# Patient Record
Sex: FEMALE | Race: WHITE | NOT HISPANIC OR LATINO
[De-identification: names, ages, dates, MRNs, and addresses within clinical notes are randomized per-mention and may not be internally consistent; named-entity substitution may affect disease eponyms.]

---
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
## 2024-03-15 ENCOUNTER — APPOINTMENT (OUTPATIENT)
Dept: ORTHOPEDIC SURGERY | Facility: CLINIC | Age: 19
End: 2024-03-15
Payer: COMMERCIAL

## 2024-03-15 VITALS — HEIGHT: 66 IN | WEIGHT: 180 LBS | BODY MASS INDEX: 28.93 KG/M2

## 2024-03-15 PROCEDURE — 99213 OFFICE O/P EST LOW 20 MIN: CPT

## 2024-03-15 PROCEDURE — 73562 X-RAY EXAM OF KNEE 3: CPT | Mod: LT

## 2024-03-29 ENCOUNTER — APPOINTMENT (OUTPATIENT)
Dept: MRI IMAGING | Facility: CLINIC | Age: 19
End: 2024-03-29
Payer: COMMERCIAL

## 2024-03-29 ENCOUNTER — OUTPATIENT (OUTPATIENT)
Dept: OUTPATIENT SERVICES | Facility: HOSPITAL | Age: 19
LOS: 1 days | End: 2024-03-29
Payer: COMMERCIAL

## 2024-03-29 DIAGNOSIS — M25.362 OTHER INSTABILITY, LEFT KNEE: ICD-10-CM

## 2024-03-29 PROCEDURE — 73721 MRI JNT OF LWR EXTRE W/O DYE: CPT | Mod: 26,LT

## 2024-03-29 PROCEDURE — 73721 MRI JNT OF LWR EXTRE W/O DYE: CPT

## 2024-03-29 NOTE — DISCUSSION/SUMMARY
[de-identified] : 18-year-old female with recurrent left knee patellar instability.  We discussed treatment options at length.  We discussed nonoperative care with activity modification physical therapy with risk of further instability episodes.  We discussed surgical treatment.  This would likely be a left knee medial patellofemoral ligament reconstruction with hamstring allograft.  However we will obtain an MRI to further assess and discuss any potential additional procedures including patellar realignment based off the MRI findings.  Risks benefits alternatives were discussed at length including but not limited to risks such as bleeding infection nerve or vessel injury continued pain stiffness redislocation need for future surgery medical complications such as DVT or PE and anesthesia complications.  All questions were answered she will schedule her convenience we will obtain an MRI preoperatively

## 2024-03-29 NOTE — HISTORY OF PRESENT ILLNESS
[de-identified] : 18-year-old female presents for follow-up for her left knee.  Last seen several years ago for her left knee.  She states 4 weeks ago she laterally dislocated her kneecap, this was self reduced several moments later.  She placed her self into a knee immobilizer for a couple of days, then to a hinged knee brace for a couple more days.  She does feel significant improvement since then.  Initially her knee was swollen she developed bruising.  She has mild pain with kneeling.  Denies numbness tingling

## 2024-03-29 NOTE — PHYSICAL EXAM
[de-identified] : left knee exam  Skin: Clean, dry, intact Inspection: No obvious malalignment, no masses, no swelling, no effusion. Tenderness: no MJLT. No LJLT. + tenderness over the medial and lateral patella facets. No ttp medial/lateral epicondyle, patella tendon, tibial tubercle, pes anserinus, or proximal fibula. ROM: 0 to 140 no pain with deep flexion Patella: 3 quadrants lateral translation decreased rebound.  Positive J sign. Stability: Stable to varus, valgus, lachman testing. Negative anterior/posterior drawer. Additional tests: Negative McMurrays test, Negative patellar grind test.  Strength: 5/5 Q/H/TA/GS/EHL, no atrophy Neuro: In tact to light touch throughout in dp/sp/tib/aime/saph nerve districutions, DTR's normal Pulses: 2+ DP/PT pulses. [de-identified] : MRI right knee reviewed 2022.  Findings consistent with patellar instability.  There is edema at the lateral condyle and medial patellar facet   The following radiographs were ordered and read by me during this patients visit. I reviewed each radiograph in detail with the patient and discussed the findings as highlighted below.  3 views left knee were obtained today joint spaces are well-maintained there is dysplastic trochlea there is a patella michelle.

## 2024-03-29 NOTE — ADDENDUM
[FreeTextEntry1] : post op knee brace is essential following surgery on 5/20/24 for post op recovery and physical therapy.

## 2024-05-04 ENCOUNTER — NON-APPOINTMENT (OUTPATIENT)
Age: 19
End: 2024-05-04

## 2024-05-06 ENCOUNTER — OUTPATIENT (OUTPATIENT)
Dept: OUTPATIENT SERVICES | Facility: HOSPITAL | Age: 19
LOS: 1 days | Discharge: HOME | End: 2024-05-06

## 2024-05-06 VITALS
WEIGHT: 192.24 LBS | RESPIRATION RATE: 17 BRPM | SYSTOLIC BLOOD PRESSURE: 120 MMHG | TEMPERATURE: 98 F | DIASTOLIC BLOOD PRESSURE: 84 MMHG | OXYGEN SATURATION: 100 % | HEART RATE: 85 BPM | HEIGHT: 66 IN

## 2024-05-06 DIAGNOSIS — M25.362 OTHER INSTABILITY, LEFT KNEE: ICD-10-CM

## 2024-05-06 DIAGNOSIS — Z01.818 ENCOUNTER FOR OTHER PREPROCEDURAL EXAMINATION: ICD-10-CM

## 2024-05-06 LAB
ANION GAP SERPL CALC-SCNC: 10 MMOL/L — SIGNIFICANT CHANGE UP (ref 5–17)
BUN SERPL-MCNC: 14 MG/DL — SIGNIFICANT CHANGE UP (ref 7–23)
CALCIUM SERPL-MCNC: 9.3 MG/DL — SIGNIFICANT CHANGE UP (ref 8.5–10.1)
CHLORIDE SERPL-SCNC: 109 MMOL/L — HIGH (ref 96–108)
CO2 SERPL-SCNC: 21 MMOL/L — LOW (ref 22–31)
CREAT SERPL-MCNC: 0.72 MG/DL — SIGNIFICANT CHANGE UP (ref 0.5–1.3)
EGFR: 123 ML/MIN/1.73M2 — SIGNIFICANT CHANGE UP
GLUCOSE SERPL-MCNC: 82 MG/DL — SIGNIFICANT CHANGE UP (ref 70–99)
HCG SERPL-ACNC: <1 MIU/ML — SIGNIFICANT CHANGE UP
HCT VFR BLD CALC: 43.8 % — SIGNIFICANT CHANGE UP (ref 34.5–45)
HGB BLD-MCNC: 14.2 G/DL — SIGNIFICANT CHANGE UP (ref 11.5–15.5)
MCHC RBC-ENTMCNC: 28.5 PG — SIGNIFICANT CHANGE UP (ref 27–34)
MCHC RBC-ENTMCNC: 32.4 G/DL — SIGNIFICANT CHANGE UP (ref 32–36)
MCV RBC AUTO: 87.8 FL — SIGNIFICANT CHANGE UP (ref 80–100)
NRBC # BLD: 0 /100 WBCS — SIGNIFICANT CHANGE UP (ref 0–0)
PLATELET # BLD AUTO: 273 K/UL — SIGNIFICANT CHANGE UP (ref 150–400)
POTASSIUM SERPL-MCNC: 3.8 MMOL/L — SIGNIFICANT CHANGE UP (ref 3.5–5.3)
POTASSIUM SERPL-SCNC: 3.8 MMOL/L — SIGNIFICANT CHANGE UP (ref 3.5–5.3)
RBC # BLD: 4.99 M/UL — SIGNIFICANT CHANGE UP (ref 3.8–5.2)
RBC # FLD: 13.3 % — SIGNIFICANT CHANGE UP (ref 10.3–14.5)
SODIUM SERPL-SCNC: 140 MMOL/L — SIGNIFICANT CHANGE UP (ref 135–145)
WBC # BLD: 7.8 K/UL — SIGNIFICANT CHANGE UP (ref 3.8–10.5)
WBC # FLD AUTO: 7.8 K/UL — SIGNIFICANT CHANGE UP (ref 3.8–10.5)

## 2024-05-06 RX ORDER — SODIUM CHLORIDE 9 MG/ML
3 INJECTION INTRAMUSCULAR; INTRAVENOUS; SUBCUTANEOUS EVERY 8 HOURS
Refills: 0 | Status: DISCONTINUED | OUTPATIENT
Start: 2024-05-20 | End: 2024-06-03

## 2024-05-06 NOTE — H&P PST ADULT - ASSESSMENT
19F no pmhx c/o left knee instability here for PST for scheduled left knee medial patellofemoral ligament reconstruction with hamstring allograft to be performed by Dr. Rao on 2024  CAPRINI SCORE    AGE RELATED RISK FACTORS                                                             [ ] Age 41-60 years                                            (1 Point)  [ ] Age: 61-74 years                                           (2 Points)                 [ ] Age= 75 years                                                (3 Points)             DISEASE RELATED RISK FACTORS                                                       [ ] Edema in the lower extremities                 (1 Point)                     [ ] Varicose veins                                               (1 Point)                                 [x ] BMI > 25 Kg/m2                                            (1 Point)                                  [ ] Serious infection (ie PNA)                            (1 Point)                     [ ] Lung disease ( COPD, Emphysema)            (1 Point)                                                                          [ ] Acute myocardial infarction                         (1 Point)                  [ ] Congestive heart failure (in the previous month)  (1 Point)         [ ] Inflammatory bowel disease                            (1 Point)                  [ ] Central venous access, PICC or Port               (2 points)       (within the last month)                                                                [ ] Stroke (in the previous month)                        (5 Points)    [ ] Previous or present malignancy                       (2 points)                                                                                                                                                         HEMATOLOGY RELATED FACTORS                                                         [ ] Prior episodes of VTE                                     (3 Points)                     [ ] Positive family history for VTE                      (3 Points)                  [ ] Prothrombin 26949 A                                     (3 Points)                     [ ] Factor V Leiden                                                (3 Points)                        [ ] Lupus anticoagulants                                      (3 Points)                                                           [ ] Anticardiolipin antibodies                              (3 Points)                                                       [ ] High homocysteine in the blood                   (3 Points)                                             [ ] Other congenital or acquired thrombophilia      (3 Points)                                                [ ] Heparin induced thrombocytopenia                  (3 Points)                                        MOBILITY RELATED FACTORS  [ ] Bed rest                                                         (1 Point)  [ ] Plaster cast                                                    (2 points)  [ ] Bed bound for more than 72 hours           (2 Points)    GENDER SPECIFIC FACTORS  [ ] Pregnancy or had a baby within the last month   (1 Point)  [ ] Post-partum < 6 weeks                                   (1 Point)  [ ] Hormonal therapy  or oral contraception   (1 Point)  [ ] History of pregnancy complications              (1 point)  [ ] Unexplained or recurrent              (1 Point)    OTHER RISK FACTORS                                           (1 Point)  [ ] BMI >40, smoking, diabetes requiring insulin, chemotherapy  blood transfusions and length of surgery over 2 hours    SURGERY RELATED RISK FACTORS  [ ]  Section within the last month     (1 Point)  [ ] Minor surgery                                                  (1 Point)  [ ] Arthroscopic surgery                                       (2 Points)  [ x ] Planned major surgery lasting more            (2 Points)      than 45 minutes     [ ] Elective hip or knee joint replacement       (5 points)       surgery                                                TRAUMA RELATED RISK FACTORS  [ ] Fracture of the hip, pelvis, or leg                       (5 Points)  [ ] Spinal cord injury resulting in paralysis             (5 points)       (in the previous month)    [ ] Paralysis  (less than 1 month)                             (5 Points)  [ ] Multiple Trauma within 1 month                        (5 Points)    Total Score [  3      ]    Caprini Score 0-2: Low Risk, NO VTE prophylaxis required for most patients, encourage ambulation  Caprini Score 3-6: Moderate Risk , pharmacologic VTE prophylaxis is indicated for most patients (in the absence of contraindications)  Caprini Score Greater than or =7: High risk, pharmocologic VTE prophylaxis indicated for most patients (in the absence of contraindications)

## 2024-05-06 NOTE — H&P PST ADULT - PROBLEM SELECTOR PLAN 1
left knee medial patellofemoral ligament reconstruction with hamstring allograft  Pre-op instructions given by RN, patient verbalized understanding  Chlorhexidine wash instructions given   Anesthesiologist to review PST labs, EKG, required clearances and optimization for surgery.

## 2024-05-06 NOTE — H&P PST ADULT - HISTORY OF PRESENT ILLNESS
19F no pmhx c/o left knee instability here for PST for scheduled left knee medial patellofemoral ligament reconstruction with hamstring allograft to be performed by Dr. Rao on 5-

## 2024-05-19 ENCOUNTER — TRANSCRIPTION ENCOUNTER (OUTPATIENT)
Age: 19
End: 2024-05-19

## 2024-05-20 ENCOUNTER — OUTPATIENT (OUTPATIENT)
Dept: OUTPATIENT SERVICES | Facility: HOSPITAL | Age: 19
LOS: 1 days | Discharge: HOME | End: 2024-05-20
Payer: COMMERCIAL

## 2024-05-20 ENCOUNTER — TRANSCRIPTION ENCOUNTER (OUTPATIENT)
Age: 19
End: 2024-05-20

## 2024-05-20 ENCOUNTER — APPOINTMENT (OUTPATIENT)
Dept: ORTHOPEDIC SURGERY | Facility: HOSPITAL | Age: 19
End: 2024-05-20

## 2024-05-20 VITALS
WEIGHT: 195.11 LBS | HEART RATE: 84 BPM | OXYGEN SATURATION: 99 % | HEIGHT: 66 IN | TEMPERATURE: 98 F | SYSTOLIC BLOOD PRESSURE: 118 MMHG | DIASTOLIC BLOOD PRESSURE: 82 MMHG | RESPIRATION RATE: 14 BRPM

## 2024-05-20 VITALS
RESPIRATION RATE: 12 BRPM | DIASTOLIC BLOOD PRESSURE: 79 MMHG | OXYGEN SATURATION: 99 % | SYSTOLIC BLOOD PRESSURE: 111 MMHG | HEART RATE: 82 BPM

## 2024-05-20 LAB — HCG UR QL: NEGATIVE — SIGNIFICANT CHANGE UP

## 2024-05-20 PROCEDURE — 27427 RECONSTRUCTION KNEE: CPT | Mod: LT

## 2024-05-20 DEVICE — IMP SYS BIO-COMPOSITE MPFL: Type: IMPLANTABLE DEVICE | Site: LEFT | Status: FUNCTIONAL

## 2024-05-20 DEVICE — CEMENT SIMPLEX WITH TOBRAMYCIN: Type: IMPLANTABLE DEVICE | Site: LEFT | Status: FUNCTIONAL

## 2024-05-20 RX ORDER — NALOXONE HYDROCHLORIDE 4 MG/.1ML
1 SPRAY NASAL
Qty: 1 | Refills: 0
Start: 2024-05-20 | End: 2024-05-20

## 2024-05-20 RX ORDER — ONDANSETRON 8 MG/1
1 TABLET, FILM COATED ORAL
Qty: 12 | Refills: 0
Start: 2024-05-20

## 2024-05-20 RX ORDER — ASPIRIN/CALCIUM CARB/MAGNESIUM 324 MG
1 TABLET ORAL
Qty: 30 | Refills: 0
Start: 2024-05-20 | End: 2024-06-18

## 2024-05-20 RX ORDER — OXYCODONE HYDROCHLORIDE 5 MG/1
1 TABLET ORAL
Qty: 20 | Refills: 0
Start: 2024-05-20 | End: 2024-05-24

## 2024-05-20 RX ORDER — SODIUM CHLORIDE 9 MG/ML
1000 INJECTION, SOLUTION INTRAVENOUS
Refills: 0 | Status: DISCONTINUED | OUTPATIENT
Start: 2024-05-20 | End: 2024-05-23

## 2024-05-20 RX ORDER — DOCUSATE SODIUM 100 MG
1 CAPSULE ORAL
Qty: 14 | Refills: 0
Start: 2024-05-20 | End: 2024-05-26

## 2024-05-20 RX ORDER — ONDANSETRON 8 MG/1
4 TABLET, FILM COATED ORAL ONCE
Refills: 0 | Status: DISCONTINUED | OUTPATIENT
Start: 2024-05-20 | End: 2024-05-23

## 2024-05-20 RX ORDER — HYDROMORPHONE HYDROCHLORIDE 2 MG/ML
0.5 INJECTION INTRAMUSCULAR; INTRAVENOUS; SUBCUTANEOUS
Refills: 0 | Status: DISCONTINUED | OUTPATIENT
Start: 2024-05-20 | End: 2024-05-23

## 2024-05-20 RX ORDER — FENTANYL CITRATE 50 UG/ML
50 INJECTION INTRAVENOUS
Refills: 0 | Status: DISCONTINUED | OUTPATIENT
Start: 2024-05-20 | End: 2024-05-23

## 2024-05-20 RX ADMIN — SODIUM CHLORIDE 125 MILLILITER(S): 9 INJECTION, SOLUTION INTRAVENOUS at 13:32

## 2024-05-20 NOTE — ASU DISCHARGE PLAN (ADULT/PEDIATRIC) - CARE PROVIDER_API CALL
Yossi Rao  Orthopaedic Surgery  [address], [address]  [place] [address]  Phone: [phone]  Fax: [phone]  Follow Up Time: 2 weeks

## 2024-05-20 NOTE — ASU PATIENT PROFILE, ADULT - FALL HARM RISK - HARM RISK INTERVENTIONS

## 2024-05-20 NOTE — ASU DISCHARGE PLAN (ADULT/PEDIATRIC) - ASU DC SPECIAL INSTRUCTIONSFT
Please refer to Dr. Rao's instruction sheet regarding medications to take and activity after surgery

## 2024-05-20 NOTE — BRIEF OPERATIVE NOTE - DISPOSITION
Requested medication(s) are due for refill today: Yes  Patient has already received a courtesy refill: No  Other reason request has been forwarded to provider:
PACU to home

## 2024-05-20 NOTE — BRIEF OPERATIVE NOTE - NSICDXBRIEFPROCEDURE_GEN_ALL_CORE_FT
PROCEDURES:  Arthroscopy of knee with reconstruction of medial patellofemoral ligament (MPFL) using hamstring graft 20-May-2024 13:45:29 left Rick Pelayo

## 2024-05-20 NOTE — ASU DISCHARGE PLAN (ADULT/PEDIATRIC) - NS MD DC FALL RISK RISK
For information on Fall & Injury Prevention, visit: [email or web address] OR  [email or web address] OR  https://www.cdc.gov/steadi/patient.html

## 2024-05-27 ENCOUNTER — NON-APPOINTMENT (OUTPATIENT)
Age: 19
End: 2024-05-27

## 2024-05-27 DIAGNOSIS — M23.52 CHRONIC INSTABILITY OF KNEE, LEFT KNEE: ICD-10-CM

## 2024-05-31 ENCOUNTER — APPOINTMENT (OUTPATIENT)
Dept: ORTHOPEDIC SURGERY | Facility: CLINIC | Age: 19
End: 2024-05-31
Payer: COMMERCIAL

## 2024-05-31 DIAGNOSIS — M25.362 OTHER INSTABILITY, LEFT KNEE: ICD-10-CM

## 2024-05-31 PROBLEM — Z78.9 OTHER SPECIFIED HEALTH STATUS: Chronic | Status: ACTIVE | Noted: 2024-05-06

## 2024-05-31 PROCEDURE — 99024 POSTOP FOLLOW-UP VISIT: CPT

## 2024-05-31 NOTE — HISTORY OF PRESENT ILLNESS
[de-identified] : L knee [de-identified] : 18 y/o F s/p  Left knee diagnostic arthroscopy, open medial patellofemoral ligament reconstruction with semitendinosus allograft, interpretation under fluoroscopy 05/20/2024.  She is overall doing well pain is controlled she is starting therapy using brace and crutches [de-identified] : left knee exam  Incisions are healing well no erythema Mild effusion  Range of motion 0-30 There is 2 quadrants of lateral patellar translation with normal rebound calf is soft and nontender  Able to flex and extend all toes  Sensation intact throughout  brisk capillary refill. [de-identified] : 20 y/o F s/p  Left knee diagnostic arthroscopy, open medial patellofemoral ligament reconstruction with semitendinosus allograft, interpretation under fluoroscopy 05/20/2024. [de-identified] : We reviewed postoperative protocol restrictions reviewed intraoperative photographs continue partial weightbearing with crutches brace unlocked at this time 0 to 30 degrees will progress weekly with physical therapy she will continue aspirin for DVT prophylaxis.  She will follow-up in 1 month.  All questions answered

## 2024-07-05 ENCOUNTER — APPOINTMENT (OUTPATIENT)
Dept: ORTHOPEDIC SURGERY | Facility: CLINIC | Age: 19
End: 2024-07-05
Payer: COMMERCIAL

## 2024-07-05 VITALS — HEIGHT: 66 IN | WEIGHT: 180 LBS | BODY MASS INDEX: 28.93 KG/M2

## 2024-07-05 DIAGNOSIS — M25.362 OTHER INSTABILITY, LEFT KNEE: ICD-10-CM

## 2024-07-05 PROCEDURE — 99024 POSTOP FOLLOW-UP VISIT: CPT

## 2024-07-08 ENCOUNTER — NON-APPOINTMENT (OUTPATIENT)
Age: 19
End: 2024-07-08

## 2024-08-20 ENCOUNTER — NON-APPOINTMENT (OUTPATIENT)
Age: 19
End: 2024-08-20

## 2024-08-21 ENCOUNTER — APPOINTMENT (OUTPATIENT)
Dept: ORTHOPEDIC SURGERY | Facility: CLINIC | Age: 19
End: 2024-08-21
Payer: COMMERCIAL

## 2024-08-21 DIAGNOSIS — M25.362 OTHER INSTABILITY, LEFT KNEE: ICD-10-CM

## 2024-08-21 PROCEDURE — 99213 OFFICE O/P EST LOW 20 MIN: CPT

## 2024-08-21 NOTE — PHYSICAL EXAM
[de-identified] : left knee exam Incisions are healing well no erythema Mild effusion Range of motion 0-130 There is 2 quadrants of lateral patellar translation with normal rebound She is able to straight leg raise with no extensor lag calf is soft and nontender Able to flex and extend all toes Sensation intact throughout brisk capillary refill.

## 2024-08-21 NOTE — DISCUSSION/SUMMARY
[de-identified] : She is overall doing very well 3 months status post patellar stabilization she will continue therapy and exercise program she may start treadmill straight line jogging.  She will continue her therapy she will follow-up in 2 to 3 months

## 2024-08-21 NOTE — HISTORY OF PRESENT ILLNESS
[de-identified] : 20 y/o F s/p Left knee diagnostic arthroscopy, open medial patellofemoral ligament reconstruction with semitendinosus allograft.  She is overall doing well reports no pain work with physical therapy she is returning to school later this week at [place]

## 2024-11-27 ENCOUNTER — APPOINTMENT (OUTPATIENT)
Dept: ORTHOPEDIC SURGERY | Facility: CLINIC | Age: 19
End: 2024-11-27
Payer: COMMERCIAL

## 2024-11-27 VITALS — HEIGHT: 66 IN | BODY MASS INDEX: 30.53 KG/M2 | WEIGHT: 190 LBS

## 2024-11-27 DIAGNOSIS — M25.362 OTHER INSTABILITY, LEFT KNEE: ICD-10-CM

## 2024-11-27 PROCEDURE — 99213 OFFICE O/P EST LOW 20 MIN: CPT

## 2025-05-14 ENCOUNTER — APPOINTMENT (OUTPATIENT)
Dept: ORTHOPEDIC SURGERY | Facility: CLINIC | Age: 20
End: 2025-05-14
Payer: COMMERCIAL

## 2025-05-14 DIAGNOSIS — M25.362 OTHER INSTABILITY, LEFT KNEE: ICD-10-CM

## 2025-05-14 PROCEDURE — 99213 OFFICE O/P EST LOW 20 MIN: CPT

## (undated) DEVICE — STAPLER SKIN MULTI DIRECTION W35

## (undated) DEVICE — CRYO/CUFF GRAVITY COOLER KNEE LARGE

## (undated) DEVICE — VENODYNE/SCD SLEEVE CALF MEDIUM

## (undated) DEVICE — SAW BLADE STRYKER RECIPROCATING 77.6X0.77X11.2MM

## (undated) DEVICE — DRAPE 3/4 SHEET W REINFORCEMENT 56X77"

## (undated) DEVICE — DRAPE STERI-DRAPE INCISE 32X33"

## (undated) DEVICE — GLV 8 PROTEXIS (WHITE)

## (undated) DEVICE — SUT NDL MAYO CATGUT 1/2 CIRCLE TAPER POINT 0.056" X 1.950"

## (undated) DEVICE — SUT VICRYL 0 27" CT-1 UNDYED

## (undated) DEVICE — WARMING BLANKET UPPER ADULT

## (undated) DEVICE — DRSG AQUACEL 3.5 X 10"

## (undated) DEVICE — DRAPE EXTREMITY 87" X 128.5"

## (undated) DEVICE — SAW BLADE STRYKER SAGITTAL DUAL CUT 25X90X1.27MM

## (undated) DEVICE — SUCTION YANKAUER TAPERED BULBOUS NO VENT

## (undated) DEVICE — SUT VICRYL 2-0 27" CT-2 UNDYED

## (undated) DEVICE — HOOD T5 PEELAWAY

## (undated) DEVICE — DRSG ACE BANDAGE 6"

## (undated) DEVICE — ZIMMER PULSAVAC PLUS FAN KIT

## (undated) DEVICE — SUT FIBERWIRE #2 38" STRAND 1 BLUE 1 WHITE/BLACK

## (undated) DEVICE — TOURNIQUET ESMARK 6"

## (undated) DEVICE — DRAPE SHOWER CURTAIN ISOLATION

## (undated) DEVICE — SYR LUER LOK 50CC

## (undated) DEVICE — FRA-ESU BOVIE FORCE TRIAD T6D04548DX: Type: DURABLE MEDICAL EQUIPMENT

## (undated) DEVICE — POSITIONER FOAM BUMP FLAT TOP 10X6X4" LRG

## (undated) DEVICE — DRAPE IOBAN 33" X 23"

## (undated) DEVICE — SOL IRR POUR NS 0.9% 1000ML

## (undated) DEVICE — GLV 7.5 PROTEXIS (WHITE)

## (undated) DEVICE — SUT STRATAFIX SYMMETRIC PDS PLUS 1 18" CTX VIOLET

## (undated) DEVICE — DRSG COBAN 6"

## (undated) DEVICE — MIXER BONE CEMENT EVAC III

## (undated) DEVICE — PACK BASIC

## (undated) DEVICE — STRYKER MIXEVAC 3 BONE CEMENT MIXER